# Patient Record
Sex: FEMALE | Race: BLACK OR AFRICAN AMERICAN | NOT HISPANIC OR LATINO | URBAN - METROPOLITAN AREA
[De-identification: names, ages, dates, MRNs, and addresses within clinical notes are randomized per-mention and may not be internally consistent; named-entity substitution may affect disease eponyms.]

---

## 2022-02-24 ENCOUNTER — WEB ENCOUNTER (OUTPATIENT)
Dept: URBAN - METROPOLITAN AREA CLINIC 70 | Facility: CLINIC | Age: 67
End: 2022-02-24

## 2022-02-24 ENCOUNTER — LAB OUTSIDE AN ENCOUNTER (OUTPATIENT)
Dept: URBAN - METROPOLITAN AREA CLINIC 70 | Facility: CLINIC | Age: 67
End: 2022-02-24

## 2022-02-24 ENCOUNTER — OFFICE VISIT (OUTPATIENT)
Dept: URBAN - METROPOLITAN AREA CLINIC 70 | Facility: CLINIC | Age: 67
End: 2022-02-24
Payer: COMMERCIAL

## 2022-02-24 DIAGNOSIS — R10.12 LUQ ABDOMINAL PAIN: ICD-10-CM

## 2022-02-24 PROCEDURE — 99203 OFFICE O/P NEW LOW 30 MIN: CPT

## 2022-02-24 RX ORDER — OMEPRAZOLE 20 MG/1
1 CAPSULE 30 MINUTES BEFORE MORNING MEAL CAPSULE, DELAYED RELEASE ORAL ONCE A DAY
Status: ON HOLD | COMMUNITY

## 2022-02-24 RX ORDER — METFORMIN HYDROCHLORIDE 500 MG/1
1 TABLET WITH A MEAL TABLET, FILM COATED ORAL ONCE A DAY
Status: DISCONTINUED | COMMUNITY

## 2022-02-24 RX ORDER — ERGOCALCIFEROL 1.25 MG/1
1 CAPSULE CAPSULE ORAL
Status: ACTIVE | COMMUNITY

## 2022-02-24 RX ORDER — ZOLPIDEM TARTRATE 10 MG/1
1 TABLET AT BEDTIME AS NEEDED TABLET, FILM COATED ORAL ONCE A DAY
Status: ON HOLD | COMMUNITY

## 2022-02-24 RX ORDER — PRAVASTATIN SODIUM 10 MG/1
1 TABLET TABLET ORAL ONCE A DAY
Status: ACTIVE | COMMUNITY

## 2022-02-24 RX ORDER — SUCRALFATE 1 G/1
DISSOLVE 1 TABLET IN A SMALL AMOUNT OF WATER, THEN DRINK TABLET ORAL TWICE A DAY
Qty: 24 | Refills: 1 | OUTPATIENT
Start: 2022-02-24 | End: 2022-03-24

## 2022-02-24 RX ORDER — MELOXICAM 15 MG/1
1 TABLET TABLET ORAL ONCE A DAY
Status: DISCONTINUED | COMMUNITY

## 2022-02-24 RX ORDER — ESCITALOPRAM 20 MG/1
1 TABLET TABLET, FILM COATED ORAL ONCE A DAY
Status: ON HOLD | COMMUNITY

## 2022-02-24 RX ORDER — LOSARTAN POTASSIUM 50 MG/1
1 TABLET TABLET ORAL ONCE A DAY
Status: ACTIVE | COMMUNITY

## 2022-02-24 RX ORDER — METHYLPREDNISOLONE 4 MG/1
1 TABLET WITH FOOD OR MILK TABLET ORAL
Status: DISCONTINUED | COMMUNITY

## 2022-02-24 NOTE — PHYSICAL EXAM GASTROINTESTINAL
Abdomen,  soft, Mild LUQ TTP, nondistended,  no guarding or rigidity,  no masses palpable,  normal bowel sounds

## 2022-02-24 NOTE — HPI-TODAY'S VISIT:
Patient was referred by Scarlet Mera MD for an evaluation of GERD. A copy of this note will be sent to the referring provider.  Pt presents for LUQ abdominal pain.  Pt states LUQ abdominal pain developed after getting the Covid vaccine.  She admits to a constant and sharp LUQ abdominal pain.  She was taking a daily PPI, but discontinued the medication due to no improvement in symptoms.  She had been taking Meloxicam daily for arm pain.  No recent imaging.  Recent H and H were normal per the pt.  She denies dysphagia, indigestion, associated regurgitation, hematemesis, melena, constipation, diarrhea, weight loss, or rectal bleeding. She has never had an EGD.

## 2022-03-14 ENCOUNTER — OFFICE VISIT (OUTPATIENT)
Dept: URBAN - METROPOLITAN AREA SURGERY CENTER 24 | Facility: SURGERY CENTER | Age: 67
End: 2022-03-14
Payer: COMMERCIAL

## 2022-03-14 ENCOUNTER — CLAIMS CREATED FROM THE CLAIM WINDOW (OUTPATIENT)
Dept: URBAN - METROPOLITAN AREA CLINIC 4 | Facility: CLINIC | Age: 67
End: 2022-03-14
Payer: COMMERCIAL

## 2022-03-14 ENCOUNTER — TELEPHONE ENCOUNTER (OUTPATIENT)
Dept: URBAN - METROPOLITAN AREA CLINIC 92 | Facility: CLINIC | Age: 67
End: 2022-03-14

## 2022-03-14 DIAGNOSIS — B96.81 BACTERIAL INFECTION DUE TO H. PYLORI: ICD-10-CM

## 2022-03-14 DIAGNOSIS — B96.81 HELICOBACTER PYLORI [H. PYLORI] AS THE CAUSE OF DISEASES CLASSIFIED ELSEWHERE: ICD-10-CM

## 2022-03-14 DIAGNOSIS — K21.9 GASTRO-ESOPHAGEAL REFLUX DISEASE WITHOUT ESOPHAGITIS: ICD-10-CM

## 2022-03-14 DIAGNOSIS — R10.12 ABDOMINAL BURNING SENSATION IN LEFT UPPER QUADRANT: ICD-10-CM

## 2022-03-14 DIAGNOSIS — K29.60 OTHER GASTRITIS WITHOUT BLEEDING: ICD-10-CM

## 2022-03-14 DIAGNOSIS — K29.60 ADENOPAPILLOMATOSIS GASTRICA: ICD-10-CM

## 2022-03-14 DIAGNOSIS — K21.9 ACID REFLUX: ICD-10-CM

## 2022-03-14 PROCEDURE — 88312 SPECIAL STAINS GROUP 1: CPT | Performed by: PATHOLOGY

## 2022-03-14 PROCEDURE — G8907 PT DOC NO EVENTS ON DISCHARG: HCPCS | Performed by: INTERNAL MEDICINE

## 2022-03-14 PROCEDURE — 43239 EGD BIOPSY SINGLE/MULTIPLE: CPT | Performed by: INTERNAL MEDICINE

## 2022-03-14 PROCEDURE — 88342 IMHCHEM/IMCYTCHM 1ST ANTB: CPT | Performed by: PATHOLOGY

## 2022-03-14 PROCEDURE — 88305 TISSUE EXAM BY PATHOLOGIST: CPT | Performed by: PATHOLOGY

## 2022-03-14 RX ORDER — LOSARTAN POTASSIUM 50 MG/1
1 TABLET TABLET ORAL ONCE A DAY
Status: ACTIVE | COMMUNITY

## 2022-03-14 RX ORDER — SUCRALFATE 1 G/1
DISSOLVE 1 TABLET IN A SMALL AMOUNT OF WATER, THEN DRINK TABLET ORAL TWICE A DAY
Qty: 24 | Refills: 1 | Status: ACTIVE | COMMUNITY
Start: 2022-02-24 | End: 2022-03-24

## 2022-03-14 RX ORDER — ZOLPIDEM TARTRATE 10 MG/1
1 TABLET AT BEDTIME AS NEEDED TABLET, FILM COATED ORAL ONCE A DAY
Status: ON HOLD | COMMUNITY

## 2022-03-14 RX ORDER — ERGOCALCIFEROL 1.25 MG/1
1 CAPSULE CAPSULE ORAL
Status: ACTIVE | COMMUNITY

## 2022-03-14 RX ORDER — ESCITALOPRAM 20 MG/1
1 TABLET TABLET, FILM COATED ORAL ONCE A DAY
Status: ON HOLD | COMMUNITY

## 2022-03-14 RX ORDER — PRAVASTATIN SODIUM 10 MG/1
1 TABLET TABLET ORAL ONCE A DAY
Status: ACTIVE | COMMUNITY

## 2022-03-14 RX ORDER — PANTOPRAZOLE SODIUM 40 MG/1
1 TABLET TABLET, DELAYED RELEASE ORAL TWICE A DAY
Qty: 180 TABLET | Refills: 0 | OUTPATIENT
Start: 2022-03-14

## 2022-03-14 RX ORDER — OMEPRAZOLE 20 MG/1
1 CAPSULE 30 MINUTES BEFORE MORNING MEAL CAPSULE, DELAYED RELEASE ORAL ONCE A DAY
Status: ON HOLD | COMMUNITY

## 2022-03-22 ENCOUNTER — TELEPHONE ENCOUNTER (OUTPATIENT)
Dept: URBAN - METROPOLITAN AREA CLINIC 92 | Facility: CLINIC | Age: 67
End: 2022-03-22

## 2022-03-22 RX ORDER — CLARITHROMYCIN 500 MG/1
1 TABLET TABLET, FILM COATED ORAL
Qty: 28 TABLET | Refills: 0 | OUTPATIENT
Start: 2022-03-22 | End: 2022-04-05

## 2022-03-22 RX ORDER — AMOXICILLIN 500 MG/1
2 CAPSULES CAPSULE ORAL
Qty: 56 CAPSULE | Refills: 0 | OUTPATIENT
Start: 2022-03-22 | End: 2022-04-05

## 2022-03-22 RX ORDER — PANTOPRAZOLE SODIUM 40 MG/1
1 TABLET TABLET, DELAYED RELEASE ORAL ONCE A DAY
Qty: 14 TABLET | Refills: 0 | OUTPATIENT
Start: 2022-03-22

## 2022-03-25 ENCOUNTER — TELEPHONE ENCOUNTER (OUTPATIENT)
Dept: URBAN - METROPOLITAN AREA CLINIC 70 | Facility: CLINIC | Age: 67
End: 2022-03-25

## 2022-03-28 ENCOUNTER — LAB OUTSIDE AN ENCOUNTER (OUTPATIENT)
Dept: URBAN - METROPOLITAN AREA CLINIC 70 | Facility: CLINIC | Age: 67
End: 2022-03-28

## 2022-03-28 ENCOUNTER — OFFICE VISIT (OUTPATIENT)
Dept: URBAN - METROPOLITAN AREA CLINIC 70 | Facility: CLINIC | Age: 67
End: 2022-03-28
Payer: COMMERCIAL

## 2022-03-28 VITALS
SYSTOLIC BLOOD PRESSURE: 128 MMHG | HEART RATE: 94 BPM | HEIGHT: 64 IN | BODY MASS INDEX: 25.27 KG/M2 | TEMPERATURE: 97.7 F | WEIGHT: 148 LBS | DIASTOLIC BLOOD PRESSURE: 86 MMHG

## 2022-03-28 DIAGNOSIS — K27.9 PEPTIC ULCER, SITE UNSPECIFIED, UNSPECIFIED AS ACUTE OR CHRONIC, WITHOUT HEMORRHAGE OR PERFORATION: ICD-10-CM

## 2022-03-28 DIAGNOSIS — R10.13 EPIGASTRIC ABDOMINAL PAIN: ICD-10-CM

## 2022-03-28 DIAGNOSIS — B96.81 HELICOBACTER PYLORI [H. PYLORI] AS THE CAUSE OF DISEASES CLASSIFIED ELSEWHERE: ICD-10-CM

## 2022-03-28 PROBLEM — 6185008: Status: ACTIVE | Noted: 2022-03-28

## 2022-03-28 PROBLEM — 79922009: Status: ACTIVE | Noted: 2022-03-28

## 2022-03-28 PROBLEM — 13200003: Status: ACTIVE | Noted: 2022-03-28

## 2022-03-28 PROCEDURE — 99213 OFFICE O/P EST LOW 20 MIN: CPT

## 2022-03-28 RX ORDER — ESCITALOPRAM 20 MG/1
1 TABLET TABLET, FILM COATED ORAL ONCE A DAY
Status: DISCONTINUED | COMMUNITY

## 2022-03-28 RX ORDER — PANTOPRAZOLE SODIUM 40 MG/1
1 TABLET TABLET, DELAYED RELEASE ORAL TWICE A DAY
Qty: 180 TABLET | Refills: 0 | Status: DISCONTINUED | COMMUNITY
Start: 2022-03-14

## 2022-03-28 RX ORDER — ERGOCALCIFEROL 1.25 MG/1
1 CAPSULE CAPSULE ORAL
Status: DISCONTINUED | COMMUNITY

## 2022-03-28 RX ORDER — SUCRALFATE 1 G
1 TABLET ON AN EMPTY STOMACH TABLET ORAL TWICE A DAY
Status: ACTIVE | COMMUNITY

## 2022-03-28 RX ORDER — SUCRALFATE 1 G
1 TABLET ON AN EMPTY STOMACH TABLET ORAL TWICE A DAY
OUTPATIENT

## 2022-03-28 RX ORDER — PRAVASTATIN SODIUM 10 MG/1
1 TABLET TABLET ORAL ONCE A DAY
Status: DISCONTINUED | COMMUNITY

## 2022-03-28 RX ORDER — AMOXICILLIN 500 MG/1
2 CAPSULES CAPSULE ORAL
OUTPATIENT
Start: 2022-03-22

## 2022-03-28 RX ORDER — OMEPRAZOLE 20 MG/1
1 CAPSULE 30 MINUTES BEFORE MORNING MEAL CAPSULE, DELAYED RELEASE ORAL ONCE A DAY
OUTPATIENT

## 2022-03-28 RX ORDER — CLARITHROMYCIN 500 MG/1
1 TABLET TABLET, FILM COATED ORAL
Qty: 28 TABLET | Refills: 0 | Status: ACTIVE | COMMUNITY
Start: 2022-03-22 | End: 2022-04-05

## 2022-03-28 RX ORDER — PANTOPRAZOLE SODIUM 40 MG/1
1 TABLET TABLET, DELAYED RELEASE ORAL ONCE A DAY
OUTPATIENT
Start: 2022-03-22

## 2022-03-28 RX ORDER — OMEPRAZOLE 20 MG/1
1 CAPSULE 30 MINUTES BEFORE MORNING MEAL CAPSULE, DELAYED RELEASE ORAL ONCE A DAY
Status: ACTIVE | COMMUNITY

## 2022-03-28 RX ORDER — AMOXICILLIN 500 MG/1
2 CAPSULES CAPSULE ORAL
Qty: 56 CAPSULE | Refills: 0 | Status: ACTIVE | COMMUNITY
Start: 2022-03-22 | End: 2022-04-05

## 2022-03-28 RX ORDER — LOSARTAN POTASSIUM 50 MG/1
1 TABLET TABLET ORAL ONCE A DAY
Status: ACTIVE | COMMUNITY

## 2022-03-28 RX ORDER — ZOLPIDEM TARTRATE 10 MG/1
1 TABLET AT BEDTIME AS NEEDED TABLET, FILM COATED ORAL ONCE A DAY
Status: DISCONTINUED | COMMUNITY

## 2022-03-28 RX ORDER — CLARITHROMYCIN 500 MG/1
1 TABLET TABLET, FILM COATED ORAL
OUTPATIENT
Start: 2022-03-22

## 2022-03-28 RX ORDER — PANTOPRAZOLE SODIUM 40 MG/1
1 TABLET TABLET, DELAYED RELEASE ORAL ONCE A DAY
Qty: 14 TABLET | Refills: 0 | Status: ACTIVE | COMMUNITY
Start: 2022-03-22

## 2022-03-28 NOTE — HPI-TODAY'S VISIT:
Pt presents for a procedure f/u.  EGD 3/14/2022 showed moderately severe esophagitis and a 5 mm nonbleeding gastric ulcer. Pathology positive for h. pylori.  Pt was treated for H. pylori following the procedure. Today she states she is confused why she is taking abx and acid suppression medication.  Discussed medications and dx of h. pylori thoroughly with the pt. She admits to improvements in epigastric abdominal pain. Pt discontinued Meloxicam since L[facility]. She denies constipation, diarrhea, weight loss, rectal bleeding, melena, or hematemesis.

## 2022-04-12 ENCOUNTER — OFFICE VISIT (OUTPATIENT)
Dept: URBAN - METROPOLITAN AREA SURGERY CENTER 24 | Facility: SURGERY CENTER | Age: 67
End: 2022-04-12
Payer: COMMERCIAL

## 2022-04-12 ENCOUNTER — CLAIMS CREATED FROM THE CLAIM WINDOW (OUTPATIENT)
Dept: URBAN - METROPOLITAN AREA CLINIC 4 | Facility: CLINIC | Age: 67
End: 2022-04-12
Payer: COMMERCIAL

## 2022-04-12 DIAGNOSIS — K31.A0 GASTRIC INTESTINAL METAPLASIA, UNSPECIFIED: ICD-10-CM

## 2022-04-12 DIAGNOSIS — K29.60 ADENOPAPILLOMATOSIS GASTRICA: ICD-10-CM

## 2022-04-12 PROCEDURE — 43239 EGD BIOPSY SINGLE/MULTIPLE: CPT | Performed by: INTERNAL MEDICINE

## 2022-04-12 PROCEDURE — 88342 IMHCHEM/IMCYTCHM 1ST ANTB: CPT | Performed by: PATHOLOGY

## 2022-04-12 PROCEDURE — G8907 PT DOC NO EVENTS ON DISCHARG: HCPCS | Performed by: INTERNAL MEDICINE

## 2022-04-12 PROCEDURE — 88305 TISSUE EXAM BY PATHOLOGIST: CPT | Performed by: PATHOLOGY

## 2022-04-12 RX ORDER — PANTOPRAZOLE SODIUM 40 MG/1
1 TABLET TABLET, DELAYED RELEASE ORAL ONCE A DAY
Status: ACTIVE | COMMUNITY
Start: 2022-03-22

## 2022-04-12 RX ORDER — CLARITHROMYCIN 500 MG/1
1 TABLET TABLET, FILM COATED ORAL
Status: ACTIVE | COMMUNITY
Start: 2022-03-22

## 2022-04-12 RX ORDER — LOSARTAN POTASSIUM 50 MG/1
1 TABLET TABLET ORAL ONCE A DAY
Status: ACTIVE | COMMUNITY

## 2022-04-12 RX ORDER — AMOXICILLIN 500 MG/1
2 CAPSULES CAPSULE ORAL
Status: ACTIVE | COMMUNITY
Start: 2022-03-22

## 2022-04-12 RX ORDER — SUCRALFATE 1 G
1 TABLET ON AN EMPTY STOMACH TABLET ORAL TWICE A DAY
Status: ACTIVE | COMMUNITY

## 2022-04-26 ENCOUNTER — TELEPHONE ENCOUNTER (OUTPATIENT)
Dept: URBAN - METROPOLITAN AREA CLINIC 70 | Facility: CLINIC | Age: 67
End: 2022-04-26

## 2022-04-26 ENCOUNTER — OFFICE VISIT (OUTPATIENT)
Dept: URBAN - METROPOLITAN AREA CLINIC 70 | Facility: CLINIC | Age: 67
End: 2022-04-26

## 2022-04-26 RX ORDER — PANTOPRAZOLE SODIUM 40 MG/1
1 TABLET TABLET, DELAYED RELEASE ORAL ONCE A DAY
Status: ACTIVE | COMMUNITY
Start: 2022-03-22

## 2022-04-26 RX ORDER — CLARITHROMYCIN 500 MG/1
1 TABLET TABLET, FILM COATED ORAL
Status: ACTIVE | COMMUNITY
Start: 2022-03-22

## 2022-04-26 RX ORDER — LOSARTAN POTASSIUM 50 MG/1
1 TABLET TABLET ORAL ONCE A DAY
Status: ACTIVE | COMMUNITY

## 2022-04-26 RX ORDER — AMOXICILLIN 500 MG/1
2 CAPSULES CAPSULE ORAL
Status: ACTIVE | COMMUNITY
Start: 2022-03-22

## 2022-04-26 RX ORDER — SUCRALFATE 1 G
1 TABLET ON AN EMPTY STOMACH TABLET ORAL TWICE A DAY
Status: ACTIVE | COMMUNITY

## 2022-04-26 NOTE — HPI-TODAY'S VISIT:
Pt presents for a procedure f/u and hx of h. pylori.  EGD 4/12/2022 showed gastritis and no ulcers found. Stool test ordered at [facility] to confirm erradication of H. pylori infection not completed.

## 2022-04-26 NOTE — HPI-OTHER HISTORIES
OV 3/28/2022: Pt presents for a procedure f/u.  EGD 3/14/2022 showed moderately severe esophagitis and a 5 mm nonbleeding gastric ulcer. Pathology positive for h. pylori.  Pt was treated for H. pylori following the procedure. Today she states she is confused why she is taking abx and acid suppression medication.  Discussed medications and dx of h. pylori thoroughly with the pt. She admits to improvements in epigastric abdominal pain. Pt discontinued Meloxicam since LOV. She denies constipation, diarrhea, weight loss, rectal bleeding, melena, or hematemesis.  OV 2/24/2022: Patient was referred by Scarlet Mera MD for an evaluation of GERD. A copy of this note will be sent to the referring provider.  Pt presents for LUQ abdominal pain.  Pt states LUQ abdominal pain developed after getting the Covid vaccine.  She admits to a constant and sharp LUQ abdominal pain.  She was taking a daily PPI, but discontinued the medication due to no improvement in symptoms.  She had been taking Meloxicam daily for arm pain.  No recent imaging.  Recent H and H were normal per the pt.  She denies dysphagia, indigestion, associated regurgitation, hematemesis, melena, constipation, diarrhea, weight loss, or rectal bleeding. She has never had an EGD.

## 2022-05-06 ENCOUNTER — TELEPHONE ENCOUNTER (OUTPATIENT)
Dept: URBAN - METROPOLITAN AREA CLINIC 70 | Facility: CLINIC | Age: 67
End: 2022-05-06

## 2022-05-06 LAB — H. PYLORI STOOL AG, EIA: POSITIVE

## 2022-05-06 RX ORDER — PANTOPRAZOLE SODIUM 40 MG/1
TAKE 1 TABLET IN THE MORNING ON AN EMPTY STOMACH, WAIT 30 MINUTES, THEN START EATING TABLET, DELAYED RELEASE ORAL ONCE A DAY
Qty: 14 | Refills: 0 | OUTPATIENT
Start: 2022-05-06

## 2022-05-06 RX ORDER — PANTOPRAZOLE SODIUM 40 MG/1
1 TABLET TABLET, DELAYED RELEASE ORAL ONCE A DAY
Status: ACTIVE | COMMUNITY
Start: 2022-03-22

## 2022-05-06 RX ORDER — AMOXICILLIN 500 MG/1
2 CAPSULES CAPSULE ORAL
Status: ACTIVE | COMMUNITY
Start: 2022-03-22

## 2022-05-06 RX ORDER — LOSARTAN POTASSIUM 50 MG/1
1 TABLET TABLET ORAL ONCE A DAY
Status: ACTIVE | COMMUNITY

## 2022-05-06 RX ORDER — CLARITHROMYCIN 500 MG/1
1 TABLET TABLET, FILM COATED ORAL
Status: ACTIVE | COMMUNITY
Start: 2022-03-22

## 2022-05-06 RX ORDER — CLARITHROMYCIN 500 MG/1
1 TABLET TABLET, FILM COATED ORAL
Qty: 28 TABLET | Refills: 0 | OUTPATIENT
Start: 2022-05-06 | End: 2022-05-20

## 2022-05-06 RX ORDER — SUCRALFATE 1 G
1 TABLET ON AN EMPTY STOMACH TABLET ORAL TWICE A DAY
Status: ACTIVE | COMMUNITY

## 2022-05-06 RX ORDER — AMOXICILLIN 500 MG/1
2 CAPSULES CAPSULE ORAL EVERY 12 HOURS
Qty: 56 CAPSULE | Refills: 0 | OUTPATIENT
Start: 2022-05-06 | End: 2022-05-20

## 2022-05-19 ENCOUNTER — OFFICE VISIT (OUTPATIENT)
Dept: URBAN - METROPOLITAN AREA CLINIC 70 | Facility: CLINIC | Age: 67
End: 2022-05-19
Payer: COMMERCIAL

## 2022-05-19 VITALS
DIASTOLIC BLOOD PRESSURE: 80 MMHG | SYSTOLIC BLOOD PRESSURE: 119 MMHG | WEIGHT: 148.2 LBS | BODY MASS INDEX: 25.3 KG/M2 | HEART RATE: 86 BPM | HEIGHT: 64 IN | TEMPERATURE: 98.1 F

## 2022-05-19 DIAGNOSIS — A04.8 H. PYLORI INFECTION: ICD-10-CM

## 2022-05-19 PROCEDURE — 99214 OFFICE O/P EST MOD 30 MIN: CPT

## 2022-05-19 RX ORDER — PANTOPRAZOLE SODIUM 40 MG/1
TAKE 1 TABLET IN THE MORNING ON AN EMPTY STOMACH, WAIT 30 MINUTES, THEN START EATING TABLET, DELAYED RELEASE ORAL ONCE A DAY
OUTPATIENT
Start: 2022-05-06

## 2022-05-19 RX ORDER — CLARITHROMYCIN 500 MG/1
1 TABLET TABLET, FILM COATED ORAL
Qty: 28 TABLET | Refills: 0 | Status: ACTIVE | COMMUNITY
Start: 2022-05-06 | End: 2022-05-20

## 2022-05-19 RX ORDER — PANTOPRAZOLE SODIUM 40 MG/1
TAKE 1 TABLET IN THE MORNING ON AN EMPTY STOMACH, WAIT 30 MINUTES, THEN START EATING TABLET, DELAYED RELEASE ORAL ONCE A DAY
Qty: 14 | Refills: 0 | Status: ACTIVE | COMMUNITY
Start: 2022-05-06

## 2022-05-19 RX ORDER — AMOXICILLIN 500 MG/1
2 CAPSULES CAPSULE ORAL EVERY 12 HOURS
OUTPATIENT
Start: 2022-05-06

## 2022-05-19 RX ORDER — SUCRALFATE 1 G
1 TABLET ON AN EMPTY STOMACH TABLET ORAL TWICE A DAY
Status: ACTIVE | COMMUNITY

## 2022-05-19 RX ORDER — CLARITHROMYCIN 500 MG/1
1 TABLET TABLET, FILM COATED ORAL
OUTPATIENT
Start: 2022-05-06

## 2022-05-19 RX ORDER — LOSARTAN POTASSIUM 50 MG/1
1 TABLET TABLET ORAL ONCE A DAY
Status: ACTIVE | COMMUNITY

## 2022-05-19 RX ORDER — AMOXICILLIN 500 MG/1
2 CAPSULES CAPSULE ORAL EVERY 12 HOURS
Qty: 56 CAPSULE | Refills: 0 | Status: ACTIVE | COMMUNITY
Start: 2022-05-06 | End: 2022-05-20

## 2022-05-19 NOTE — HPI-TODAY'S VISIT:
Pt presents for a procedure f/u and hx of h. pylori.  EGD 4/12/2022 showed gastritis and no ulcers found. H pylori stool test ordered at [facility] was positive.  Rx was sent for retreatment. Today the pt states epigastric pain and GERD symptoms have improved.  She states that she has been taking the antibiotics for treatment of h. pylori.  She denies melena, nausea, vomiting, hematemesis, constipation, diarrhea, rectal bleeding, or unintentional weight loss.

## 2022-05-20 ENCOUNTER — TELEPHONE ENCOUNTER (OUTPATIENT)
Dept: URBAN - METROPOLITAN AREA CLINIC 70 | Facility: CLINIC | Age: 67
End: 2022-05-20

## 2022-05-20 RX ORDER — CLARITHROMYCIN 500 MG/1
1 TABLET TABLET, FILM COATED ORAL
Qty: 20 TABLET
Start: 2022-05-06 | End: 2022-05-30

## 2022-05-20 RX ORDER — PANTOPRAZOLE SODIUM 40 MG/1
1 TABLET TABLET, DELAYED RELEASE ORAL TWICE A DAY
Qty: 20 TABLET
Start: 2022-05-06

## 2022-05-20 RX ORDER — AMOXICILLIN 500 MG/1
2 CAPSULES CAPSULE ORAL EVERY 12 HOURS
Qty: 40 CAPSULE
Start: 2022-05-06 | End: 2022-05-30

## 2022-06-01 LAB — H. PYLORI STOOL AG, EIA: NEGATIVE

## 2022-06-30 ENCOUNTER — OFFICE VISIT (OUTPATIENT)
Dept: URBAN - METROPOLITAN AREA CLINIC 70 | Facility: CLINIC | Age: 67
End: 2022-06-30
Payer: COMMERCIAL

## 2022-06-30 VITALS
BODY MASS INDEX: 24.79 KG/M2 | HEART RATE: 93 BPM | SYSTOLIC BLOOD PRESSURE: 109 MMHG | TEMPERATURE: 98 F | DIASTOLIC BLOOD PRESSURE: 74 MMHG | WEIGHT: 145.2 LBS | HEIGHT: 64 IN

## 2022-06-30 DIAGNOSIS — Z86.19 HISTORY OF HELICOBACTER PYLORI INFECTION: ICD-10-CM

## 2022-06-30 DIAGNOSIS — K21.9 GASTROESOPHAGEAL REFLUX DISEASE, UNSPECIFIED WHETHER ESOPHAGITIS PRESENT: ICD-10-CM

## 2022-06-30 DIAGNOSIS — K59.04 CHRONIC IDIOPATHIC CONSTIPATION: ICD-10-CM

## 2022-06-30 PROBLEM — 235595009: Status: ACTIVE | Noted: 2022-06-30

## 2022-06-30 PROCEDURE — 99214 OFFICE O/P EST MOD 30 MIN: CPT

## 2022-06-30 RX ORDER — LOSARTAN POTASSIUM 50 MG/1
1 TABLET TABLET ORAL ONCE A DAY
Status: ACTIVE | COMMUNITY

## 2022-06-30 RX ORDER — SUCRALFATE 1 G
1 TABLET ON AN EMPTY STOMACH TABLET ORAL TWICE A DAY
Status: DISCONTINUED | COMMUNITY

## 2022-06-30 RX ORDER — PANTOPRAZOLE SODIUM 40 MG/1
1 TABLET TABLET, DELAYED RELEASE ORAL TWICE A DAY
Qty: 20 TABLET | Status: ACTIVE | COMMUNITY
Start: 2022-05-06

## 2022-06-30 RX ORDER — PANTOPRAZOLE SODIUM 40 MG/1
TAKE 1 TABLET IN THE MORNING ON AN EMPTY STOMACH, WAIT 30 MINUTES, THEN START EATING TABLET, DELAYED RELEASE ORAL ONCE A DAY
Qty: 90 | Refills: 3
Start: 2022-05-06

## 2022-06-30 NOTE — HPI-TODAY'S VISIT:
Pt presents for a f/u for H. pylori.  Stool test 6/1/2022 was negative. Today she states GERD symptoms have returned.  She states that she ran out of her Pantoprazole 40mg rx and has not been taking the medication for the last 2 weeks.  She admits to minor improvement in constipation with Senna.  She denies improvement with miralax or metamucil.  She denies rectal bleeding, weight loss, nausea, vomiting, or diarrhea.

## 2022-06-30 NOTE — HPI-OTHER HISTORIES
OV 5/19/2022: Pt presents for a procedure f/u and hx of h. pylori.  EGD 4/12/2022 showed gastritis and no ulcers found. H pylori stool test ordered at [facility] was positive.  Rx was sent for retreatment. Today the pt states epigastric pain and GERD symptoms have improved.  She states that she has been taking the antibiotics for treatment of h. pylori.  She denies melena, nausea, vomiting, hematemesis, constipation, diarrhea, rectal bleeding, or unintentional weight loss. ------------------------------------------ OV 3/28/2022: Pt presents for a procedure f/u.  EGD 3/14/2022 showed moderately severe esophagitis and a 5 mm nonbleeding gastric ulcer. Pathology positive for h. pylori.  Pt was treated for H. pylori following the procedure. Today she states she is confused why she is taking abx and acid suppression medication.  Discussed medications and dx of h. pylori thoroughly with the pt. She admits to improvements in epigastric abdominal pain. Pt discontinued Meloxicam since LOV. She denies constipation, diarrhea, weight loss, rectal bleeding, melena, or hematemesis.  -------------------------------------------------------------- OV 2/24/2022: Patient was referred by Scarlet Mera MD for an evaluation of GERD. A copy of this note will be sent to the referring provider.  Pt presents for LUQ abdominal pain.  Pt states LUQ abdominal pain developed after getting the Covid vaccine.  She admits to a constant and sharp LUQ abdominal pain.  She was taking a daily PPI, but discontinued the medication due to no improvement in symptoms.  She had been taking Meloxicam daily for arm pain.  No recent imaging.  Recent H and H were normal per the pt.  She denies dysphagia, indigestion, associated regurgitation, hematemesis, melena, constipation, diarrhea, weight loss, or rectal bleeding. She has never had an EGD.

## 2022-08-30 ENCOUNTER — OFFICE VISIT (OUTPATIENT)
Dept: URBAN - METROPOLITAN AREA CLINIC 70 | Facility: CLINIC | Age: 67
End: 2022-08-30
Payer: COMMERCIAL

## 2022-08-30 VITALS
SYSTOLIC BLOOD PRESSURE: 104 MMHG | TEMPERATURE: 97.9 F | HEART RATE: 75 BPM | WEIGHT: 145 LBS | HEIGHT: 64 IN | DIASTOLIC BLOOD PRESSURE: 72 MMHG | BODY MASS INDEX: 24.75 KG/M2

## 2022-08-30 DIAGNOSIS — K30 INDIGESTION: ICD-10-CM

## 2022-08-30 DIAGNOSIS — K59.04 CHRONIC IDIOPATHIC CONSTIPATION: ICD-10-CM

## 2022-08-30 DIAGNOSIS — R10.12 LUQ ABDOMINAL PAIN: ICD-10-CM

## 2022-08-30 PROCEDURE — 99214 OFFICE O/P EST MOD 30 MIN: CPT

## 2022-08-30 RX ORDER — SENNOSIDES 8.8 MG/5ML
10 ML AT BEDTIME AS NEEDED LIQUID ORAL ONCE A DAY
Qty: 300 ML | Refills: 6 | OUTPATIENT
Start: 2022-08-30 | End: 2023-03-28

## 2022-08-30 RX ORDER — SUCRALFATE 1 G/1
DISSOLVE 1 TABLET IN A SMALL AMOUNT OF WATER, THEN DRINK TABLET ORAL TWICE A DAY
Qty: 60 | Refills: 2 | OUTPATIENT
Start: 2022-08-30 | End: 2022-11-27

## 2022-08-30 RX ORDER — LOSARTAN POTASSIUM 50 MG/1
1 TABLET TABLET ORAL ONCE A DAY
Status: ACTIVE | COMMUNITY

## 2022-08-30 RX ORDER — TRAZODONE HYDROCHLORIDE 50 MG/1
1 TABLET AT BEDTIME AS NEEDED TABLET ORAL ONCE A DAY
Status: ACTIVE | COMMUNITY

## 2022-08-30 RX ORDER — PANTOPRAZOLE SODIUM 40 MG/1
TAKE 1 TABLET IN THE MORNING ON AN EMPTY STOMACH, WAIT 30 MINUTES, THEN START EATING TABLET, DELAYED RELEASE ORAL ONCE A DAY
OUTPATIENT
Start: 2022-05-06

## 2022-08-30 RX ORDER — PANTOPRAZOLE SODIUM 40 MG/1
TAKE 1 TABLET IN THE MORNING ON AN EMPTY STOMACH, WAIT 30 MINUTES, THEN START EATING TABLET, DELAYED RELEASE ORAL ONCE A DAY
Qty: 90 | Refills: 3 | Status: ACTIVE | COMMUNITY
Start: 2022-05-06

## 2022-08-30 NOTE — HPI-OTHER HISTORIES
OV 6/30/2022: Pt presents for a f/u for H. pylori.  Stool test 6/1/2022 was negative. Today she states GERD symptoms have returned.  She states that she ran out of her Pantoprazole 40mg rx and has not been taking the medication for the last 2 weeks.  She admits to minor improvement in constipation with Senna.  She denies improvement with miralax or metamucil.  She denies rectal bleeding, weight loss, nausea, vomiting, or diarrhea. ------------------------------------------------- OV 5/19/2022: Pt presents for a procedure f/u and hx of h. pylori.  EGD 4/12/2022 showed gastritis and no ulcers found. H pylori stool test ordered at [facility] was positive.  Rx was sent for retreatment. Today the pt states epigastric pain and GERD symptoms have improved.  She states that she has been taking the antibiotics for treatment of h. pylori.  She denies melena, nausea, vomiting, hematemesis, constipation, diarrhea, rectal bleeding, or unintentional weight loss. ------------------------------------------ OV 3/28/2022: Pt presents for a procedure f/u.  EGD 3/14/2022 showed moderately severe esophagitis and a 5 mm nonbleeding gastric ulcer. Pathology positive for h. pylori.  Pt was treated for H. pylori following the procedure. Today she states she is confused why she is taking abx and acid suppression medication.  Discussed medications and dx of h. pylori thoroughly with the pt. She admits to improvements in epigastric abdominal pain. Pt discontinued Meloxicam since LOV. She denies constipation, diarrhea, weight loss, rectal bleeding, melena, or hematemesis.  -------------------------------------------------------------- OV 2/24/2022: Patient was referred by Scarlet Mera MD for an evaluation of GERD. A copy of this note will be sent to the referring provider.  Pt presents for LUQ abdominal pain.  Pt states LUQ abdominal pain developed after getting the Covid vaccine.  She admits to a constant and sharp LUQ abdominal pain.  She was taking a daily PPI, but discontinued the medication due to no improvement in symptoms.  She had been taking Meloxicam daily for arm pain.  No recent imaging.  Recent H and H were normal per the pt.  She denies dysphagia, indigestion, associated regurgitation, hematemesis, melena, constipation, diarrhea, weight loss, or rectal bleeding. She has never had an EGD.

## 2022-08-30 NOTE — PHYSICAL EXAM GASTROINTESTINAL
Abdomen , soft, mild LUQ TTP, nondistended , no guarding or rigidity , no masses palpable , normal bowel sounds , Liver and Spleen , no hepatosplenomegaly , liver nontender

## 2022-09-13 ENCOUNTER — TELEPHONE ENCOUNTER (OUTPATIENT)
Dept: URBAN - METROPOLITAN AREA CLINIC 70 | Facility: CLINIC | Age: 67
End: 2022-09-13

## 2022-09-13 PROBLEM — 162031009: Status: ACTIVE | Noted: 2022-08-30

## 2022-09-13 RX ORDER — SUCRALFATE 1 G/1
DISSOLVE 1 TABLET IN A SMALL AMOUNT OF WATER, THEN DRINK TABLET ORAL TWICE A DAY
Qty: 180 | Refills: 0
Start: 2022-08-30 | End: 2022-12-11

## 2022-09-14 ENCOUNTER — TELEPHONE ENCOUNTER (OUTPATIENT)
Dept: URBAN - METROPOLITAN AREA CLINIC 70 | Facility: CLINIC | Age: 67
End: 2022-09-14

## 2022-09-14 RX ORDER — SUCRALFATE 1 G/1
DISSOLVE 1 TABLET IN A SMALL AMOUNT OF WATER, THEN DRINK TABLET ORAL TWICE A DAY
Qty: 180 | Refills: 0
Start: 2022-08-30 | End: 2022-12-13

## 2022-09-20 ENCOUNTER — TELEPHONE ENCOUNTER (OUTPATIENT)
Dept: URBAN - METROPOLITAN AREA CLINIC 70 | Facility: CLINIC | Age: 67
End: 2022-09-20

## 2022-09-20 RX ORDER — PANTOPRAZOLE SODIUM 40 MG/1
TAKE 1 TABLET IN THE MORNING ON AN EMPTY STOMACH, WAIT 30 MINUTES, THEN START EATING TABLET, DELAYED RELEASE ORAL TWICE A DAY
Qty: 180 | Refills: 3
Start: 2022-05-06

## 2022-09-27 LAB — HELICOBACTER PYLORI AG, EIA, STOOL: (no result)

## 2022-09-28 ENCOUNTER — TELEPHONE ENCOUNTER (OUTPATIENT)
Dept: URBAN - METROPOLITAN AREA CLINIC 70 | Facility: CLINIC | Age: 67
End: 2022-09-28

## 2022-09-29 ENCOUNTER — LAB OUTSIDE AN ENCOUNTER (OUTPATIENT)
Dept: URBAN - METROPOLITAN AREA CLINIC 70 | Facility: CLINIC | Age: 67
End: 2022-09-29

## 2022-09-29 ENCOUNTER — OFFICE VISIT (OUTPATIENT)
Dept: URBAN - METROPOLITAN AREA CLINIC 70 | Facility: CLINIC | Age: 67
End: 2022-09-29
Payer: COMMERCIAL

## 2022-09-29 VITALS
DIASTOLIC BLOOD PRESSURE: 80 MMHG | HEART RATE: 64 BPM | SYSTOLIC BLOOD PRESSURE: 118 MMHG | HEIGHT: 64 IN | TEMPERATURE: 97.9 F | WEIGHT: 142.1 LBS | BODY MASS INDEX: 24.26 KG/M2

## 2022-09-29 DIAGNOSIS — Z87.11 HISTORY OF PEPTIC ULCER DISEASE: ICD-10-CM

## 2022-09-29 DIAGNOSIS — R10.12 LUQ ABDOMINAL PAIN: ICD-10-CM

## 2022-09-29 DIAGNOSIS — K59.04 CHRONIC IDIOPATHIC CONSTIPATION: ICD-10-CM

## 2022-09-29 PROCEDURE — 99214 OFFICE O/P EST MOD 30 MIN: CPT

## 2022-09-29 RX ORDER — PANTOPRAZOLE SODIUM 40 MG/1
TAKE 1 TABLET IN THE MORNING ON AN EMPTY STOMACH, WAIT 30 MINUTES, THEN START EATING TABLET, DELAYED RELEASE ORAL TWICE A DAY
OUTPATIENT
Start: 2022-05-06

## 2022-09-29 RX ORDER — LOSARTAN POTASSIUM 50 MG/1
1 TABLET TABLET ORAL ONCE A DAY
Status: ACTIVE | COMMUNITY

## 2022-09-29 RX ORDER — SENNOSIDES 8.8 MG/5ML
10 ML AT BEDTIME AS NEEDED LIQUID ORAL ONCE A DAY
Qty: 300 ML | Refills: 6 | Status: ACTIVE | COMMUNITY
Start: 2022-08-30 | End: 2023-03-28

## 2022-09-29 RX ORDER — PANTOPRAZOLE SODIUM 40 MG/1
TAKE 1 TABLET IN THE MORNING ON AN EMPTY STOMACH, WAIT 30 MINUTES, THEN START EATING TABLET, DELAYED RELEASE ORAL TWICE A DAY
Qty: 180 | Refills: 3 | Status: ACTIVE | COMMUNITY
Start: 2022-05-06

## 2022-09-29 RX ORDER — SUCRALFATE 1 G/1
DISSOLVE 1 TABLET IN A SMALL AMOUNT OF WATER, THEN DRINK TABLET ORAL TWICE A DAY
Qty: 180 | Refills: 0 | Status: ACTIVE | COMMUNITY
Start: 2022-08-30 | End: 2022-12-13

## 2022-09-29 RX ORDER — SENNOSIDES 8.8 MG/5ML
10 ML AT BEDTIME AS NEEDED LIQUID ORAL ONCE A DAY
OUTPATIENT
Start: 2022-08-30

## 2022-09-29 RX ORDER — TRAZODONE HYDROCHLORIDE 50 MG/1
1 TABLET AT BEDTIME AS NEEDED TABLET ORAL ONCE A DAY
Status: ACTIVE | COMMUNITY

## 2022-09-29 RX ORDER — SUCRALFATE 1 G/1
DISSOLVE 1 TABLET IN A SMALL AMOUNT OF WATER, THEN DRINK TABLET ORAL TWICE A DAY
Qty: 180 | Refills: 2
Start: 2022-08-30 | End: 2023-06-26

## 2022-09-29 NOTE — HPI-TODAY'S VISIT:
The pt presents for a f/u for abdominal pain.  She admits to [facility] abdominal pain.  EGD 3/14/2022 showed moderately severe esophagitis and a 5 mm nonbleeding gastric ulcer. Pathology positive for h. pylori. She was treated for H. pylori twice and eradication of the infection was confirmed with a stool test performed 5/28/2022.  EGD 4/12/2022 showed gastritis and no ulcers found.  Today she states the abdominal pain had improved, but now symptoms feel similar to when she had gastric ulcers a few months ago.  She is currently taking Pantopazole 40mg BID and sucralfate BID with minor improvement in symptoms. She denies use of NSAIDs. She denies nausea, vomiting, weight loss, decreased appetite, or melena.  Constipation is well managed with Senna daily.

## 2022-09-29 NOTE — HPI-OTHER HISTORIES
OV 8/30/2022: The pt presents for a f/u for GERD and constipation. Today she states constipation has improved with Senna OTC.  Linzess 72mcg did not improve constipation. She is requesting a rx for Senna in liquid form.  She continues to taking Pantoprazole 40mg daily, but states over the last week LUQ abdominal pain has returned.  She denies recent labs or imaging since CT for LUQ abd pain which was normal. --------------------------------------------------------------- OV 6/30/2022: Pt presents for a f/u for H. pylori.  Stool test 6/1/2022 was negative. Today she states GERD symptoms have returned.  She states that she ran out of her Pantoprazole 40mg rx and has not been taking the medication for the last 2 weeks.  She admits to minor improvement in constipation with Senna.  She denies improvement with miralax or metamucil.  She denies rectal bleeding, weight loss, nausea, vomiting, or diarrhea. ------------------------------------------------- OV 5/19/2022: Pt presents for a procedure f/u and hx of h. pylori.  EGD 4/12/2022 showed gastritis and no ulcers found. H pylori stool test ordered at [facility] was positive.  Rx was sent for retreatment. Today the pt states epigastric pain and GERD symptoms have improved.  She states that she has been taking the antibiotics for treatment of h. pylori.  She denies melena, nausea, vomiting, hematemesis, constipation, diarrhea, rectal bleeding, or unintentional weight loss. ------------------------------------------ OV 3/28/2022: Pt presents for a procedure f/u.  EGD 3/14/2022 showed moderately severe esophagitis and a 5 mm nonbleeding gastric ulcer. Pathology positive for h. pylori.  Pt was treated for H. pylori following the procedure. Today she states she is confused why she is taking abx and acid suppression medication.  Discussed medications and dx of h. pylori thoroughly with the pt. She admits to improvements in epigastric abdominal pain. Pt discontinued Meloxicam since LOV. She denies constipation, diarrhea, weight loss, rectal bleeding, melena, or hematemesis.  -------------------------------------------------------------- OV 2/24/2022: Patient was referred by Scarlet Mera MD for an evaluation of GERD. A copy of this note will be sent to the referring provider.  Pt presents for LUQ abdominal pain.  Pt states LUQ abdominal pain developed after getting the Covid vaccine.  She admits to a constant and sharp LUQ abdominal pain.  She was taking a daily PPI, but discontinued the medication due to no improvement in symptoms.  She had been taking Meloxicam daily for arm pain.  No recent imaging.  Recent H and H were normal per the pt.  She denies dysphagia, indigestion, associated regurgitation, hematemesis, melena, constipation, diarrhea, weight loss, or rectal bleeding. She has never had an EGD.

## 2022-10-06 ENCOUNTER — OFFICE VISIT (OUTPATIENT)
Dept: URBAN - METROPOLITAN AREA SURGERY CENTER 24 | Facility: SURGERY CENTER | Age: 67
End: 2022-10-06
Payer: COMMERCIAL

## 2022-10-06 ENCOUNTER — CLAIMS CREATED FROM THE CLAIM WINDOW (OUTPATIENT)
Dept: URBAN - METROPOLITAN AREA CLINIC 4 | Facility: CLINIC | Age: 67
End: 2022-10-06
Payer: COMMERCIAL

## 2022-10-06 DIAGNOSIS — R10.12 ABDOMINAL BURNING SENSATION IN LEFT UPPER QUADRANT: ICD-10-CM

## 2022-10-06 DIAGNOSIS — K29.60 ADENOPAPILLOMATOSIS GASTRICA: ICD-10-CM

## 2022-10-06 DIAGNOSIS — K31.A0 GASTRIC INTESTINAL METAPLASIA, UNSPECIFIED: ICD-10-CM

## 2022-10-06 DIAGNOSIS — K21.9 GASTRO-ESOPHAGEAL REFLUX DISEASE WITHOUT ESOPHAGITIS: ICD-10-CM

## 2022-10-06 DIAGNOSIS — K21.9 ACID REFLUX: ICD-10-CM

## 2022-10-06 PROCEDURE — 88342 IMHCHEM/IMCYTCHM 1ST ANTB: CPT | Performed by: PATHOLOGY

## 2022-10-06 PROCEDURE — 88312 SPECIAL STAINS GROUP 1: CPT | Performed by: PATHOLOGY

## 2022-10-06 PROCEDURE — G8907 PT DOC NO EVENTS ON DISCHARG: HCPCS | Performed by: INTERNAL MEDICINE

## 2022-10-06 PROCEDURE — 43239 EGD BIOPSY SINGLE/MULTIPLE: CPT | Performed by: INTERNAL MEDICINE

## 2022-10-06 PROCEDURE — 88305 TISSUE EXAM BY PATHOLOGIST: CPT | Performed by: PATHOLOGY

## 2022-10-06 RX ORDER — TRAZODONE HYDROCHLORIDE 50 MG/1
1 TABLET AT BEDTIME AS NEEDED TABLET ORAL ONCE A DAY
Status: ACTIVE | COMMUNITY

## 2022-10-06 RX ORDER — SUCRALFATE 1 G/1
DISSOLVE 1 TABLET IN A SMALL AMOUNT OF WATER, THEN DRINK TABLET ORAL TWICE A DAY
Qty: 180 | Refills: 2 | Status: ACTIVE | COMMUNITY
Start: 2022-08-30 | End: 2023-06-26

## 2022-10-06 RX ORDER — SENNOSIDES 8.8 MG/5ML
10 ML AT BEDTIME AS NEEDED LIQUID ORAL ONCE A DAY
Status: ACTIVE | COMMUNITY
Start: 2022-08-30

## 2022-10-06 RX ORDER — LOSARTAN POTASSIUM 50 MG/1
1 TABLET TABLET ORAL ONCE A DAY
Status: ACTIVE | COMMUNITY

## 2022-10-06 RX ORDER — PANTOPRAZOLE SODIUM 40 MG/1
TAKE 1 TABLET IN THE MORNING ON AN EMPTY STOMACH, WAIT 30 MINUTES, THEN START EATING TABLET, DELAYED RELEASE ORAL TWICE A DAY
Status: ACTIVE | COMMUNITY
Start: 2022-05-06

## 2022-10-31 ENCOUNTER — OFFICE VISIT (OUTPATIENT)
Dept: URBAN - METROPOLITAN AREA CLINIC 70 | Facility: CLINIC | Age: 67
End: 2022-10-31
Payer: COMMERCIAL

## 2022-10-31 VITALS
HEART RATE: 66 BPM | DIASTOLIC BLOOD PRESSURE: 79 MMHG | WEIGHT: 138 LBS | HEIGHT: 64 IN | SYSTOLIC BLOOD PRESSURE: 117 MMHG | TEMPERATURE: 98 F | BODY MASS INDEX: 23.56 KG/M2

## 2022-10-31 DIAGNOSIS — R10.12 LUQ ABDOMINAL PAIN: ICD-10-CM

## 2022-10-31 DIAGNOSIS — K59.04 CHRONIC IDIOPATHIC CONSTIPATION: ICD-10-CM

## 2022-10-31 PROBLEM — 82934008: Status: ACTIVE | Noted: 2022-06-30

## 2022-10-31 PROCEDURE — 99214 OFFICE O/P EST MOD 30 MIN: CPT

## 2022-10-31 RX ORDER — LOSARTAN POTASSIUM 50 MG/1
1 TABLET TABLET ORAL ONCE A DAY
Status: ACTIVE | COMMUNITY

## 2022-10-31 RX ORDER — SENNOSIDES 8.8 MG/5ML
10 ML AT BEDTIME AS NEEDED LIQUID ORAL ONCE A DAY
OUTPATIENT
Start: 2022-08-30

## 2022-10-31 RX ORDER — PANTOPRAZOLE SODIUM 40 MG/1
TAKE 1 TABLET IN THE MORNING ON AN EMPTY STOMACH, WAIT 30 MINUTES, THEN START EATING TABLET, DELAYED RELEASE ORAL TWICE A DAY
Status: ACTIVE | COMMUNITY
Start: 2022-05-06

## 2022-10-31 RX ORDER — SUCRALFATE 1 G/1
DISSOLVE 1 TABLET IN A SMALL AMOUNT OF WATER, THEN DRINK TABLET ORAL TWICE A DAY
Qty: 180 | Refills: 2 | Status: ACTIVE | COMMUNITY
Start: 2022-08-30 | End: 2023-06-26

## 2022-10-31 RX ORDER — SENNOSIDES 8.8 MG/5ML
10 ML AT BEDTIME AS NEEDED LIQUID ORAL ONCE A DAY
Status: ACTIVE | COMMUNITY
Start: 2022-08-30

## 2022-10-31 RX ORDER — TRAZODONE HYDROCHLORIDE 50 MG/1
1 TABLET AT BEDTIME AS NEEDED TABLET ORAL ONCE A DAY
Status: ACTIVE | COMMUNITY

## 2022-10-31 NOTE — HPI-OTHER HISTORIES
OV 9/29/2022: The pt presents for a f/u for abdominal pain.  She admits to LUQ abdominal pain.  EGD 3/14/2022 showed moderately severe esophagitis and a 5 mm nonbleeding gastric ulcer. Pathology positive for h. pylori. She was treated for H. pylori twice and eradication of the infection was confirmed with a stool test performed 5/28/2022.  EGD 4/12/2022 showed gastritis and no ulcers found.  Today she states the abdominal pain had improved, but now symptoms feel similar to when she had gastric ulcers a few months ago.  She is currently taking Pantopazole 40mg BID and sucralfate BID with minor improvement in symptoms. She denies use of NSAIDs. She denies nausea, vomiting, weight loss, decreased appetite, or melena.  Constipation is well managed with Senna daily. ------------------------------ OV 8/30/2022: The pt presents for a f/u for GERD and constipation. Today she states constipation has improved with Senna OTC.  Linzess 72mcg did not improve constipation. She is requesting a rx for Senna in liquid form.  She continues to taking Pantoprazole 40mg daily, but states over the last week LUQ abdominal pain has returned.  She denies recent labs or imaging since CT for LUQ abd pain which was normal. --------------------------------------------------------------- OV 6/30/2022: Pt presents for a f/u for H. pylori.  Stool test 6/1/2022 was negative. Today she states GERD symptoms have returned.  She states that she ran out of her Pantoprazole 40mg rx and has not been taking the medication for the last 2 weeks.  She admits to minor improvement in constipation with Senna.  She denies improvement with miralax or metamucil.  She denies rectal bleeding, weight loss, nausea, vomiting, or diarrhea. ------------------------------------------------- OV 5/19/2022: Pt presents for a procedure f/u and hx of h. pylori.  EGD 4/12/2022 showed gastritis and no ulcers found. H pylori stool test ordered at [facility] was positive.  Rx was sent for retreatment. Today the pt states epigastric pain and GERD symptoms have improved.  She states that she has been taking the antibiotics for treatment of h. pylori.  She denies melena, nausea, vomiting, hematemesis, constipation, diarrhea, rectal bleeding, or unintentional weight loss. ------------------------------------------ OV 3/28/2022: Pt presents for a procedure f/u.  EGD 3/14/2022 showed moderately severe esophagitis and a 5 mm nonbleeding gastric ulcer. Pathology positive for h. pylori.  Pt was treated for H. pylori following the procedure. Today she states she is confused why she is taking abx and acid suppression medication.  Discussed medications and dx of h. pylori thoroughly with the pt. She admits to improvements in epigastric abdominal pain. Pt discontinued Meloxicam since LOV. She denies constipation, diarrhea, weight loss, rectal bleeding, melena, or hematemesis.  -------------------------------------------------------------- OV 2/24/2022: Patient was referred by Scarlet Mera MD for an evaluation of GERD. A copy of this note will be sent to the referring provider.  Pt presents for LUQ abdominal pain.  Pt states LUQ abdominal pain developed after getting the Covid vaccine.  She admits to a constant and sharp LUQ abdominal pain.  She was taking a daily PPI, but discontinued the medication due to no improvement in symptoms.  She had been taking Meloxicam daily for arm pain.  No recent imaging.  Recent H and H were normal per the pt.  She denies dysphagia, indigestion, associated regurgitation, hematemesis, melena, constipation, diarrhea, weight loss, or rectal bleeding. She has never had an EGD.

## 2022-10-31 NOTE — HPI-TODAY'S VISIT:
The pt presents for a procedure f/u.  EGD 10/6/2022 showed gastritis.  Pathology showed reflux like changes and was negative for H. pylori or dysplasia. Today she states symptoms have resolved.  She is currently taking Pantoprazole 40mg and sucralfate. She denies breakthrough symptoms.  No other GI symptoms.

## 2023-01-30 ENCOUNTER — TELEPHONE ENCOUNTER (OUTPATIENT)
Dept: URBAN - METROPOLITAN AREA CLINIC 70 | Facility: CLINIC | Age: 68
End: 2023-01-30

## 2023-02-02 ENCOUNTER — LAB OUTSIDE AN ENCOUNTER (OUTPATIENT)
Dept: URBAN - METROPOLITAN AREA CLINIC 70 | Facility: CLINIC | Age: 68
End: 2023-02-02

## 2023-02-02 ENCOUNTER — OFFICE VISIT (OUTPATIENT)
Dept: URBAN - METROPOLITAN AREA CLINIC 70 | Facility: CLINIC | Age: 68
End: 2023-02-02
Payer: COMMERCIAL

## 2023-02-02 VITALS
DIASTOLIC BLOOD PRESSURE: 76 MMHG | HEIGHT: 64 IN | HEART RATE: 56 BPM | SYSTOLIC BLOOD PRESSURE: 130 MMHG | WEIGHT: 135.2 LBS | BODY MASS INDEX: 23.08 KG/M2

## 2023-02-02 DIAGNOSIS — R10.12 LUQ ABDOMINAL PAIN: ICD-10-CM

## 2023-02-02 DIAGNOSIS — Z12.11 COLON CANCER SCREENING: ICD-10-CM

## 2023-02-02 DIAGNOSIS — R10.32 LLQ ABDOMINAL PAIN: ICD-10-CM

## 2023-02-02 PROCEDURE — 99214 OFFICE O/P EST MOD 30 MIN: CPT

## 2023-02-02 RX ORDER — MECOBALAMIN 5000 MCG
AS DIRECTED LOZENGE ORAL
Status: ACTIVE | COMMUNITY

## 2023-02-02 RX ORDER — PANTOPRAZOLE SODIUM 40 MG/1
TAKE 1 TABLET IN THE MORNING ON AN EMPTY STOMACH, WAIT 30 MINUTES, THEN START EATING TABLET, DELAYED RELEASE ORAL TWICE A DAY
Status: ACTIVE | COMMUNITY
Start: 2022-05-06

## 2023-02-02 RX ORDER — SUCRALFATE 1 G/1
DISSOLVE 1 TABLET IN A SMALL AMOUNT OF WATER, THEN DRINK TABLET ORAL TWICE A DAY
Qty: 180 | Refills: 2 | Status: ON HOLD | COMMUNITY
Start: 2022-08-30 | End: 2023-06-26

## 2023-02-02 RX ORDER — LOSARTAN POTASSIUM 50 MG/1
1 TABLET TABLET ORAL ONCE A DAY
Status: ACTIVE | COMMUNITY

## 2023-02-02 RX ORDER — SENNOSIDES 8.8 MG/5ML
10 ML AT BEDTIME AS NEEDED LIQUID ORAL ONCE A DAY
Status: ACTIVE | COMMUNITY
Start: 2022-08-30

## 2023-02-02 RX ORDER — TRAZODONE HYDROCHLORIDE 50 MG/1
1 TABLET AT BEDTIME AS NEEDED TABLET ORAL ONCE A DAY
Status: ACTIVE | COMMUNITY

## 2023-02-02 NOTE — HPI-TODAY'S VISIT:
The pt presents for left sided abdominal pain and colon cancer screening.  She has been previously seen and treated in our office for PUD and h. pylori (treated and confirmed eradication with stool test). Today she states that she was recently seen by her PCP and given a steroid injection in her back for skin itching and notes improvements in left sided abdominal pain with the treatment.  She denies constipation, diarrhea, rectal bleeding, weight loss, nausea, or vomiting.  No recent labs. Last colonoscopy was in 2010 and was normal per the pt.  She denies a fhx of colon cancer or IBD.

## 2023-02-22 LAB
C-REACTIVE PROTEIN, QUANT: 0.6
SED RATE BY MODIFIED: 2

## 2023-02-24 ENCOUNTER — CLAIMS CREATED FROM THE CLAIM WINDOW (OUTPATIENT)
Dept: URBAN - METROPOLITAN AREA CLINIC 4 | Facility: CLINIC | Age: 68
End: 2023-02-24
Payer: COMMERCIAL

## 2023-02-24 ENCOUNTER — OFFICE VISIT (OUTPATIENT)
Dept: URBAN - METROPOLITAN AREA SURGERY CENTER 24 | Facility: SURGERY CENTER | Age: 68
End: 2023-02-24
Payer: COMMERCIAL

## 2023-02-24 DIAGNOSIS — D12.0 ADENOMA OF CECUM: ICD-10-CM

## 2023-02-24 DIAGNOSIS — K63.89 APPENDICITIS EPIPLOICA: ICD-10-CM

## 2023-02-24 DIAGNOSIS — Z12.11 COLON CANCER SCREENING: ICD-10-CM

## 2023-02-24 DIAGNOSIS — D12.0 BENIGN NEOPLASM OF CECUM: ICD-10-CM

## 2023-02-24 PROCEDURE — 45380 COLONOSCOPY AND BIOPSY: CPT | Performed by: INTERNAL MEDICINE

## 2023-02-24 PROCEDURE — 45385 COLONOSCOPY W/LESION REMOVAL: CPT | Performed by: INTERNAL MEDICINE

## 2023-02-24 PROCEDURE — G8907 PT DOC NO EVENTS ON DISCHARG: HCPCS | Performed by: INTERNAL MEDICINE

## 2023-02-24 PROCEDURE — 88305 TISSUE EXAM BY PATHOLOGIST: CPT | Performed by: PATHOLOGY

## 2023-03-20 ENCOUNTER — OFFICE VISIT (OUTPATIENT)
Dept: URBAN - METROPOLITAN AREA CLINIC 70 | Facility: CLINIC | Age: 68
End: 2023-03-20

## 2023-03-22 ENCOUNTER — OFFICE VISIT (OUTPATIENT)
Dept: URBAN - METROPOLITAN AREA CLINIC 70 | Facility: CLINIC | Age: 68
End: 2023-03-22
Payer: COMMERCIAL

## 2023-03-22 VITALS
SYSTOLIC BLOOD PRESSURE: 130 MMHG | HEIGHT: 64 IN | HEART RATE: 74 BPM | DIASTOLIC BLOOD PRESSURE: 79 MMHG | WEIGHT: 130.6 LBS | TEMPERATURE: 98 F | BODY MASS INDEX: 22.3 KG/M2

## 2023-03-22 DIAGNOSIS — K58.1 IRRITABLE BOWEL SYNDROME WITH CONSTIPATION: ICD-10-CM

## 2023-03-22 DIAGNOSIS — R11.2 NAUSEA AND VOMITING, UNSPECIFIED VOMITING TYPE: ICD-10-CM

## 2023-03-22 DIAGNOSIS — R63.4 WEIGHT LOSS: ICD-10-CM

## 2023-03-22 DIAGNOSIS — Z86.19 HISTORY OF HELICOBACTER PYLORI INFECTION: ICD-10-CM

## 2023-03-22 PROBLEM — 440630006: Status: ACTIVE | Noted: 2023-03-22

## 2023-03-22 PROBLEM — 266998003: Status: ACTIVE | Noted: 2023-03-22

## 2023-03-22 PROBLEM — 21522001: Status: ACTIVE | Noted: 2023-03-22

## 2023-03-22 PROBLEM — 89362005: Status: ACTIVE | Noted: 2023-03-22

## 2023-03-22 PROBLEM — 16932000: Status: ACTIVE | Noted: 2023-03-22

## 2023-03-22 PROBLEM — 15627741000119108: Status: ACTIVE | Noted: 2023-03-22

## 2023-03-22 PROCEDURE — 99214 OFFICE O/P EST MOD 30 MIN: CPT | Performed by: INTERNAL MEDICINE

## 2023-03-22 RX ORDER — PANTOPRAZOLE SODIUM 40 MG/1
TAKE 1 TABLET IN THE MORNING ON AN EMPTY STOMACH, WAIT 30 MINUTES, THEN START EATING TABLET, DELAYED RELEASE ORAL TWICE A DAY
OUTPATIENT
Start: 2022-05-06

## 2023-03-22 RX ORDER — SUCRALFATE 1 G/1
DISSOLVE 1 TABLET IN A SMALL AMOUNT OF WATER, THEN DRINK TABLET ORAL TWICE A DAY
Qty: 180 | Refills: 2 | Status: DISCONTINUED | COMMUNITY
Start: 2022-08-30 | End: 2023-06-26

## 2023-03-22 RX ORDER — LOSARTAN POTASSIUM 50 MG/1
1 TABLET TABLET ORAL ONCE A DAY
Status: DISCONTINUED | COMMUNITY

## 2023-03-22 RX ORDER — SENNOSIDES 8.8 MG/5ML
10 ML AT BEDTIME AS NEEDED LIQUID ORAL ONCE A DAY
Status: DISCONTINUED | COMMUNITY
Start: 2022-08-30

## 2023-03-22 RX ORDER — TRAZODONE HYDROCHLORIDE 50 MG/1
1 TABLET AT BEDTIME AS NEEDED TABLET ORAL ONCE A DAY
Status: ACTIVE | COMMUNITY

## 2023-03-22 RX ORDER — LINACLOTIDE 145 UG/1
1 CAPSULE AT LEAST 30 MINUTES BEFORE THE FIRST MEAL OF THE DAY ON AN EMPTY STOMACH CAPSULE, GELATIN COATED ORAL ONCE A DAY
OUTPATIENT

## 2023-03-22 RX ORDER — PANTOPRAZOLE SODIUM 40 MG/1
TAKE 1 TABLET IN THE MORNING ON AN EMPTY STOMACH, WAIT 30 MINUTES, THEN START EATING TABLET, DELAYED RELEASE ORAL TWICE A DAY
Status: ACTIVE | COMMUNITY
Start: 2022-05-06

## 2023-03-22 RX ORDER — MIRTAZAPINE 15 MG/1
1 TABLET AT BEDTIME TABLET, FILM COATED ORAL ONCE A DAY
Status: ACTIVE | COMMUNITY

## 2023-03-22 RX ORDER — MECOBALAMIN 5000 MCG
AS DIRECTED LOZENGE ORAL
Status: ACTIVE | COMMUNITY

## 2023-03-22 RX ORDER — LINACLOTIDE 145 UG/1
1 CAPSULE AT LEAST 30 MINUTES BEFORE THE FIRST MEAL OF THE DAY ON AN EMPTY STOMACH CAPSULE, GELATIN COATED ORAL ONCE A DAY
Status: ACTIVE | COMMUNITY

## 2023-03-22 RX ORDER — DILTIAZEM HYDROCHLORIDE 180 MG/1
1 CAPSULE CAPSULE, EXTENDED RELEASE ORAL ONCE A DAY
Status: ACTIVE | COMMUNITY

## 2023-03-22 NOTE — HPI-TODAY'S VISIT:
Here for follow up  Previously seen for left sided abdominal pain  Underwent a CT scan abd/pelvis that was noted unremarkable  She underwent EGD, stomach path showed possible H.pylori- she was treated for H pylori followed by unremarkable repeat EGD  She also underwent colonoscopy: 6 mm cecal polyp s/p polypectomy (tubular adenoma), mild melanosis (normal colon biopsy), diverticulosis She was given a sample of Linzess here after colonoscopy with concern for IBS-C as she was experiencing constipation and pain Patient report that her left sided pain has significantly improved after taking Linzess Reports having weight loss- has lost about 20 lbs but reports that she has been gaining weight back up now- gained back 3 lbs  Reports intermittent nausea and vomiting. ROS otherwise unremarkable

## 2023-03-27 LAB
H PYLORI BREATH TEST: NOT DETECTED
H. PYLORI BREATH TEST: NOT DETECTED
INTERPRETATION: NOT DETECTED

## 2023-04-14 ENCOUNTER — DASHBOARD ENCOUNTERS (OUTPATIENT)
Age: 68
End: 2023-04-14

## 2023-04-19 ENCOUNTER — OFFICE VISIT (OUTPATIENT)
Dept: URBAN - METROPOLITAN AREA CLINIC 70 | Facility: CLINIC | Age: 68
End: 2023-04-19

## 2023-04-19 RX ORDER — LINACLOTIDE 145 UG/1
1 CAPSULE AT LEAST 30 MINUTES BEFORE THE FIRST MEAL OF THE DAY ON AN EMPTY STOMACH CAPSULE, GELATIN COATED ORAL ONCE A DAY
COMMUNITY

## 2023-04-19 RX ORDER — TRAZODONE HYDROCHLORIDE 50 MG/1
1 TABLET AT BEDTIME AS NEEDED TABLET ORAL ONCE A DAY
COMMUNITY

## 2023-04-19 RX ORDER — PANTOPRAZOLE SODIUM 40 MG/1
TAKE 1 TABLET IN THE MORNING ON AN EMPTY STOMACH, WAIT 30 MINUTES, THEN START EATING TABLET, DELAYED RELEASE ORAL TWICE A DAY
COMMUNITY
Start: 2022-05-06

## 2023-04-19 RX ORDER — MIRTAZAPINE 15 MG/1
1 TABLET AT BEDTIME TABLET, FILM COATED ORAL ONCE A DAY
COMMUNITY

## 2023-04-19 RX ORDER — MECOBALAMIN 5000 MCG
AS DIRECTED LOZENGE ORAL
COMMUNITY

## 2023-04-19 RX ORDER — DILTIAZEM HYDROCHLORIDE 180 MG/1
1 CAPSULE CAPSULE, EXTENDED RELEASE ORAL ONCE A DAY
COMMUNITY